# Patient Record
Sex: FEMALE | Race: WHITE | NOT HISPANIC OR LATINO | Employment: FULL TIME | URBAN - METROPOLITAN AREA
[De-identification: names, ages, dates, MRNs, and addresses within clinical notes are randomized per-mention and may not be internally consistent; named-entity substitution may affect disease eponyms.]

---

## 2024-01-27 ENCOUNTER — OFFICE VISIT (OUTPATIENT)
Dept: URGENT CARE | Facility: URGENT CARE | Age: 24
End: 2024-01-27
Payer: COMMERCIAL

## 2024-01-27 VITALS
TEMPERATURE: 99.2 F | OXYGEN SATURATION: 96 % | WEIGHT: 200 LBS | DIASTOLIC BLOOD PRESSURE: 75 MMHG | SYSTOLIC BLOOD PRESSURE: 121 MMHG | HEART RATE: 109 BPM

## 2024-01-27 DIAGNOSIS — Z20.822 SUSPECTED 2019 NOVEL CORONAVIRUS INFECTION: Primary | ICD-10-CM

## 2024-01-27 DIAGNOSIS — R07.0 THROAT PAIN: ICD-10-CM

## 2024-01-27 DIAGNOSIS — J02.8 ACUTE PHARYNGITIS DUE TO OTHER SPECIFIED ORGANISMS: ICD-10-CM

## 2024-01-27 LAB
DEPRECATED S PYO AG THROAT QL EIA: NEGATIVE
GROUP A STREP BY PCR: NOT DETECTED

## 2024-01-27 PROCEDURE — 99203 OFFICE O/P NEW LOW 30 MIN: CPT | Performed by: NURSE PRACTITIONER

## 2024-01-27 PROCEDURE — 87651 STREP A DNA AMP PROBE: CPT | Performed by: NURSE PRACTITIONER

## 2024-01-27 PROCEDURE — 87635 SARS-COV-2 COVID-19 AMP PRB: CPT | Performed by: NURSE PRACTITIONER

## 2024-01-27 RX ORDER — MONTELUKAST SODIUM 10 MG/1
1 TABLET ORAL AT BEDTIME
COMMUNITY

## 2024-01-27 RX ORDER — ALBUTEROL SULFATE 90 UG/1
AEROSOL, METERED RESPIRATORY (INHALATION)
COMMUNITY

## 2024-01-27 RX ORDER — PREDNISONE 20 MG/1
20 TABLET ORAL DAILY
Qty: 5 TABLET | Refills: 0 | Status: SHIPPED | OUTPATIENT
Start: 2024-01-27 | End: 2024-02-01

## 2024-01-27 RX ORDER — FLUTICASONE PROPIONATE AND SALMETEROL XINAFOATE 230; 21 UG/1; UG/1
2 AEROSOL, METERED RESPIRATORY (INHALATION) 2 TIMES DAILY
COMMUNITY

## 2024-01-27 NOTE — PROGRESS NOTES
Assessment & Plan     Suspected 2019 novel coronavirus infection  - Symptomatic COVID-19 Virus (Coronavirus) by PCR Nasopharyngeal    Throat pain  - Streptococcus A Rapid Screen w/Reflex to PCR - Clinic Collect  - Group A Streptococcus PCR Throat Swab    Acute pharyngitis due to other specified organisms  - predniSONE (DELTASONE) 20 MG tablet  Dispense: 5 tablet; Refill: 0     Patient Instructions     Results for orders placed or performed in visit on 01/27/24   Streptococcus A Rapid Screen w/Reflex to PCR - Clinic Collect     Status: Normal    Specimen: Throat; Swab   Result Value Ref Range    Group A Strep antigen Negative Negative     Start prednisone to bring the swelling down.    RST negative  TC covid  swab pending.    Push fluids  Lots of handwashing.   Ibuprofen as needed for fever or pain  Delsym or dayquil/nyquil for cough as needed     Rest as able.   Will call if any other labs positive.    F/u in the clinic if symptoms persist or worsen.            Return in about 1 week (around 2/3/2024) for with regular provider if symptoms persist.    [facility] JAIR Chaney is a 23 year old female who presents to clinic today for the following health issues:  Chief Complaint   Patient presents with    Urgent Care     Sore throat, on and off for the last week, tonsil is swollen, hard to swallow.      HPI    URI Adult    Onset of symptoms was 3 day(s) ago.  Course of illness is worsening.    Severity moderately severe  Current and Associated symptoms: chills, sore throat, and hoarse voice  Treatment measures tried include Tylenol/Ibuprofen and Fluids.  Predisposing factors include recurrent tonsillitis.  .    Review of Systems  Constitutional, HEENT, cardiovascular, pulmonary, GI, , musculoskeletal, neuro, skin, endocrine and psych systems are negative, except as otherwise noted.      Objective    /75   Pulse 109   Temp 99.2  F (37.3  C)  (Tympanic)   Wt 90.7 kg (200 lb)   SpO2 96%   Physical Exam   GENERAL: alert and no distress  EYES: Eyes grossly normal to inspection, PERRL and conjunctivae and sclerae normal  HENT: normal cephalic/atraumatic, ear canals and TM's normal, nose and mouth without ulcers or lesions, oral mucous membranes moist, uvula elongated, tonsillar hypertrophy, tonsillar erythema, and tonsillar exudate  NECK: no adenopathy, no asymmetry, masses, or scars  RESP: lungs clear to auscultation - no rales, rhonchi or wheezes  CV: regular rate and rhythm, normal S1 S2, no S3 or S4, no murmur, click or rub, no peripheral edema  ABDOMEN: soft, nontender, no hepatosplenomegaly, no masses and bowel sounds normal  MS: no gross musculoskeletal defects noted, no edema

## 2024-01-27 NOTE — PATIENT INSTRUCTIONS
Results for orders placed or performed in visit on 01/27/24   Streptococcus A Rapid Screen w/Reflex to PCR - Clinic Collect     Status: Normal    Specimen: Throat; Swab   Result Value Ref Range    Group A Strep antigen Negative Negative     Start prednisone to bring the swelling down.    RST negative  TC covid  swab pending.    Push fluids  Lots of handwashing.   Ibuprofen as needed for fever or pain  Delsym or dayquil/nyquil for cough as needed     Rest as able.   Will call if any other labs positive.    F/u in the clinic if symptoms persist or worsen.

## 2024-01-28 LAB — SARS-COV-2 RNA RESP QL NAA+PROBE: NEGATIVE

## 2024-03-09 ENCOUNTER — HEALTH MAINTENANCE LETTER (OUTPATIENT)
Age: 24
End: 2024-03-09

## 2025-03-16 ENCOUNTER — HEALTH MAINTENANCE LETTER (OUTPATIENT)
Age: 25
End: 2025-03-16